# Patient Record
(demographics unavailable — no encounter records)

---

## 2024-11-12 NOTE — HISTORY OF PRESENT ILLNESS
[Hyperlipidemia] : Hyperlipidemia [Hypertension] : Hypertension [Does not check BP] : The patient is not checking blood pressure [<140/90] : Target blood pressure is <140/90 [Target goal met] : BP target goal met [Doing Poorly] : Patient is doing poorly [Maxifestyle management] : lifestyle management [No therapy] : Patient is not on statin therapy [de-identified] : DASH type diet being followed [FreeTextEntry1] : has had some increase in her migraines with the recent stress over the loss of her sister to cancer. Doing a little better but still having some headache- refill for meds given today.

## 2024-11-12 NOTE — REVIEW OF SYSTEMS
[Earache] : earache [Nasal Discharge] : nasal discharge [Sore Throat] : sore throat [Postnasal Drip] : postnasal drip [Joint Pain] : joint pain [Headache] : headache [Negative] : Psychiatric [Hearing Loss] : no hearing loss [Nosebleed] : no nosebleeds [FreeTextEntry2] : stress level continues with recent dx of sister with stage 4 pancreatic cancer, continuing to do poorly currently hospitalized again and still not over the deaths of her significant other and her father. Still clearing out her father's home and very stressed. [FreeTextEntry4] : some vertigo as well - had fall on her face with some abrasions and contusions [FreeTextEntry6] : at this time none, no use of rescue inhaler needed since last visit [FreeTextEntry7] : occasional bloating- related to dietary intake- less than prior despite her stress levels [FreeTextEntry9] : fell on the steps when picking up a check from office - landed on her hands and her knees with left one very bruised and swollen still one week later. [de-identified] : some migraines recently with stress from death of father and good friend 1 year ago- stress level now with her sister who has pancreatic cancer [de-identified] : stress is now related to her sister and the knowledge that she will not likley last that much longer as she struggles with her sister's cancer moore

## 2024-11-12 NOTE — HEALTH RISK ASSESSMENT
[No] : No [Audit-CScore] : 0 [de-identified] : not much recently [de-identified] : tries to eat healthy but does not always do so especially when depressed from her grief

## 2024-11-12 NOTE — PHYSICAL EXAM
[No Acute Distress] : no acute distress [Well Nourished] : well nourished [Normal Sclera/Conjunctiva] : normal sclera/conjunctiva [Normal Outer Ear/Nose] : the outer ears and nose were normal in appearance [Normal Oropharynx] : the oropharynx was normal [Normal TMs] : both tympanic membranes were normal [No JVD] : no jugular venous distention [No Respiratory Distress] : no respiratory distress  [No Accessory Muscle Use] : no accessory muscle use [Clear to Auscultation] : lungs were clear to auscultation bilaterally [Normal Rate] : normal rate  [Regular Rhythm] : with a regular rhythm [Normal S1, S2] : normal S1 and S2 [No Murmur] : no murmur heard [Pedal Pulses Present] : the pedal pulses are present [No Edema] : there was no peripheral edema [No Focal Deficits] : no focal deficits [Normal Gait] : normal gait [Normal Affect] : the affect was normal [Alert and Oriented x3] : oriented to person, place, and time [Normal Insight/Judgement] : insight and judgment were intact [de-identified] : postnasal drip, right tm is pale white and no relex noted , left tm normal [de-identified] : no wheezing noted

## 2024-11-12 NOTE — ASSESSMENT
[FreeTextEntry1] : continue to seek counseling for her grief and continue on the lexapro- regular follow up in 3 months  discussed these plans and also ongoing bp and asthma control abx for new issue of otitis and also sinusitis - ENT evaluation for recurring infection  moderate level MDM

## 2024-11-12 NOTE — HEALTH RISK ASSESSMENT
[No] : No [Audit-CScore] : 0 [de-identified] : not much recently [de-identified] : tries to eat healthy but does not always do so especially when depressed from her grief

## 2024-11-12 NOTE — REVIEW OF SYSTEMS
[Earache] : earache [Nasal Discharge] : nasal discharge [Sore Throat] : sore throat [Postnasal Drip] : postnasal drip [Joint Pain] : joint pain [Headache] : headache [Negative] : Psychiatric [Hearing Loss] : no hearing loss [Nosebleed] : no nosebleeds [FreeTextEntry2] : stress level continues with recent dx of sister with stage 4 pancreatic cancer, continuing to do poorly currently hospitalized again and still not over the deaths of her significant other and her father. Still clearing out her father's home and very stressed. [FreeTextEntry4] : some vertigo as well - had fall on her face with some abrasions and contusions [FreeTextEntry6] : at this time none, no use of rescue inhaler needed since last visit [FreeTextEntry7] : occasional bloating- related to dietary intake- less than prior despite her stress levels [FreeTextEntry9] : fell on the steps when picking up a check from office - landed on her hands and her knees with left one very bruised and swollen still one week later. [de-identified] : some migraines recently with stress from death of father and good friend 1 year ago- stress level now with her sister who has pancreatic cancer [de-identified] : stress is now related to her sister and the knowledge that she will not likley last that much longer as she struggles with her sister's cancer moore

## 2024-11-12 NOTE — HISTORY OF PRESENT ILLNESS
[Hyperlipidemia] : Hyperlipidemia [Hypertension] : Hypertension [Does not check BP] : The patient is not checking blood pressure [<140/90] : Target blood pressure is <140/90 [Target goal met] : BP target goal met [Doing Poorly] : Patient is doing poorly [Maxifestyle management] : lifestyle management [No therapy] : Patient is not on statin therapy [de-identified] : DASH type diet being followed [FreeTextEntry1] : has had some increase in her migraines with the recent stress over the loss of her sister to cancer. Doing a little better but still having some headache- refill for meds given today.

## 2024-11-12 NOTE — PHYSICAL EXAM
[No Acute Distress] : no acute distress [Well Nourished] : well nourished [Normal Sclera/Conjunctiva] : normal sclera/conjunctiva [Normal Outer Ear/Nose] : the outer ears and nose were normal in appearance [Normal Oropharynx] : the oropharynx was normal [Normal TMs] : both tympanic membranes were normal [No JVD] : no jugular venous distention [No Respiratory Distress] : no respiratory distress  [No Accessory Muscle Use] : no accessory muscle use [Clear to Auscultation] : lungs were clear to auscultation bilaterally [Normal Rate] : normal rate  [Regular Rhythm] : with a regular rhythm [Normal S1, S2] : normal S1 and S2 [No Murmur] : no murmur heard [Pedal Pulses Present] : the pedal pulses are present [No Edema] : there was no peripheral edema [No Focal Deficits] : no focal deficits [Normal Gait] : normal gait [Normal Affect] : the affect was normal [Alert and Oriented x3] : oriented to person, place, and time [Normal Insight/Judgement] : insight and judgment were intact [de-identified] : postnasal drip, right tm is pale white and no relex noted , left tm normal [de-identified] : no wheezing noted

## 2025-02-14 NOTE — HISTORY OF PRESENT ILLNESS
[Hyperlipidemia] : Hyperlipidemia [Hypertension] : Hypertension [Does not check BP] : The patient is not checking blood pressure [<140/90] : Target blood pressure is <140/90 [Target goal met] : BP target goal met [Doing Poorly] : Patient is doing poorly [Maxifestyle management] : lifestyle management [No therapy] : Patient is not on statin therapy [de-identified] : DASH type diet being followed [FreeTextEntry1] :  her migraines seem more stable today seeing counselor weekly and this has reduced her stress although still some anxiety and depression post multiple life events over the last 2 years.

## 2025-02-14 NOTE — HEALTH RISK ASSESSMENT
[No] : No [Audit-CScore] : 0 [de-identified] : not much recently [de-identified] : tries to eat healthy but does not always do so especially when depressed from her grief

## 2025-02-14 NOTE — HEALTH RISK ASSESSMENT
[No] : No [Audit-CScore] : 0 [de-identified] : not much recently [de-identified] : tries to eat healthy but does not always do so especially when depressed from her grief

## 2025-02-14 NOTE — REVIEW OF SYSTEMS
[Joint Pain] : joint pain [Headache] : headache [Depression] : depression [Postnasal Drip] : postnasal drip [Negative] : ENT [FreeTextEntry2] : stress level improved at this time  [FreeTextEntry6] : at this time none, no use of rescue inhaler needed since last visit [FreeTextEntry7] : occasional bloating- related to dietary intake- less than prior despite her stress levels [FreeTextEntry9] : fell on the steps when picking up a check from office - landed on her hands and her knees with left one very bruised and swollen still one week later. [de-identified] : some migraines recently with stress from death of father and good friend 1 year ago- stress level now with her sister who has pancreatic cancer

## 2025-02-14 NOTE — PHYSICAL EXAM
[No Acute Distress] : no acute distress [Well Nourished] : well nourished [Normal Sclera/Conjunctiva] : normal sclera/conjunctiva [Normal Outer Ear/Nose] : the outer ears and nose were normal in appearance [Normal Oropharynx] : the oropharynx was normal [No JVD] : no jugular venous distention [No Respiratory Distress] : no respiratory distress  [No Accessory Muscle Use] : no accessory muscle use [Clear to Auscultation] : lungs were clear to auscultation bilaterally [Normal Rate] : normal rate  [Regular Rhythm] : with a regular rhythm [Normal S1, S2] : normal S1 and S2 [No Murmur] : no murmur heard [Pedal Pulses Present] : the pedal pulses are present [No Edema] : there was no peripheral edema [No Focal Deficits] : no focal deficits [Normal Gait] : normal gait [Normal Affect] : the affect was normal [Alert and Oriented x3] : oriented to person, place, and time [Normal Insight/Judgement] : insight and judgment were intact [de-identified] : postnasal drip,  [de-identified] : no wheezing noted

## 2025-02-14 NOTE — ASSESSMENT
[FreeTextEntry1] : continue to seek counseling for her grief but ready now for tapering of her medication- tapering and dicontinuation discussed and shared decision making around stopping at this time. to call for any concerns as she tapers over the next 1-2 months time.   discussed ongoing bp and asthma control  moderate level MDM with shared decision making on titration or stopping of escitalopram.

## 2025-02-14 NOTE — HISTORY OF PRESENT ILLNESS
[Hyperlipidemia] : Hyperlipidemia [Hypertension] : Hypertension [Does not check BP] : The patient is not checking blood pressure [<140/90] : Target blood pressure is <140/90 [Target goal met] : BP target goal met [Doing Poorly] : Patient is doing poorly [Maxifestyle management] : lifestyle management [No therapy] : Patient is not on statin therapy [de-identified] : DASH type diet being followed [FreeTextEntry1] :  her migraines seem more stable today seeing counselor weekly and this has reduced her stress although still some anxiety and depression post multiple life events over the last 2 years.

## 2025-06-04 NOTE — PHYSICAL EXAM
[de-identified] : Constitutional o Appearance : well-nourished, well developed, alert, in no acute distress  Head and Face o Head :  Inspection : atraumatic, normocephalic o Face :  Inspection : no visible rash or discoloration Respiratory o Respiratory Effort: breathing unlabored  Neurologic o Mental Status Examination :  Orientation : alert and oriented X 3 Psychiatric o Mood and Affect: mood normal, affect appropriate Cardiovascular o Observation/Palpation : - no swelling Lymphatic o Additional Nodes : No palpable lymph nodes present  Lumbosacral Spine o Inspection : no visible rash or discoloration o Palpation : no paraspinal musculature tenderness o Range of Motion : arc of motion full in all planes, no crepitance or pain with ROM o Muscle Strength : paraspinal muscle strength and tone within normal limits o Muscle Tone : paraspinal muscle strength and tone within normal limits o Tests: straight leg test negative and VARGHESE test negative bilaterally   Right Lower Extremity o Buttock : no tenderness, swelling or deformities o Right Hip :  Inspection/Palpation : no tenderness, no swelling or deformities, incision well-healed   Range of Motion : full flexion and rotation, painless, no crepitance  Stability : joint stability intact  Strength : extension, flexion 5/5, adduction, abduction 5/5, internal rotation and external rotation  Tests: Champ's test negative  Left Lower Extremity o Buttock : no tenderness, swelling or deformities  o Left Hip   Inspection/Palpation : minimal greater trochanteric tenderness, no swelling or deformities, incision well-healed   Range of Motion : full flexion and rotation, painless, no crepitance  Stability : joint stability intact  Strength : extension, flexion 5/5, adduction, abduction 5/5, internal rotation and external rotation  Tests: Champ's test negative  Gait: gait normal, no significant extremity swelling or lymphedema, good proprioception and balance, left slightly longer, excellent core strength. slight waddling gait abductor lurch to the right  Radiology Results 6/4/2025  o Pelvis and Hips: AP pelvis and lateral of both hips are obtained and reveal excellent position of her bilateral total hip replacements with no sign of loosening or change in position

## 2025-06-04 NOTE — HISTORY OF PRESENT ILLNESS
[de-identified] : 77 year old patient presents s/p bilateral THR (right 09/27/16, left 05/08/18). She is thrilled with her progress and results. She denies any swelling or buckling. She is thrilled with her progress and results. She is staying as active as possible. She notes she is keeping her strength up. Patient denies that she has any numbness or tingling. She does note she is walking with a lurch due to muscle memory. She denies that the pain wakes her from sleep. She has minimal complaints today. She has not had a recent bone density test.   Radiology Results 5/29/2024 o Pelvis and Left Hip: AP pelvis and lateral of the hips are obtained and reveal excellent position of her bilateral total hip replacements with no sign of loosening.  Radiology Results 5/26/2023 o Pelvis and Left Hip : AP pelvis and lateral of the hips are obtained and reveal excellent position of her bilateral total hip replacements with no sign of loosening.

## 2025-06-04 NOTE — ADDENDUM
[FreeTextEntry1] : I, LEE LOMELI, acted solely as a scribe for Dr. Carlos Jolly on this date 06/04/2025.  All medical record entries made by the Scribe were at my, Dr. Carlos Jolly, direction and personally dictated by me on 06/04/2025. I have reviewed the chart and agree that the record accurately reflects my personal performance of the history, physical exam, assessment and plan. I have also personally directed, reviewed, and agreed with the chart.

## 2025-06-04 NOTE — DISCUSSION/SUMMARY
[de-identified] : I went over the pathophysiology of the patient's symptoms in great detail with the patient. I discussed the underlying pathophysiology of the patient's condition in great detail with the patient. I went over the patient's x-rays with them in great detail. She needs to avoid high-impact activities such as running and jumping or riding a treadmill. I recommend alternative activities such as riding a stationary bike or elliptical on low tension. She should focus on light weight and high repetition exercises. She should avoid squatting and kneeling.  We have asked her to speak to Dr. Boo concerning a bone density test.  All of their questions were answered. They understand and consent to the plan.   FU in one year.

## 2025-06-11 NOTE — PHYSICAL EXAM
[No Acute Distress] : no acute distress [Normal Sclera/Conjunctiva] : normal sclera/conjunctiva [Well Nourished] : well nourished [Normal Outer Ear/Nose] : the outer ears and nose were normal in appearance [Normal Oropharynx] : the oropharynx was normal [No JVD] : no jugular venous distention [No Respiratory Distress] : no respiratory distress  [No Accessory Muscle Use] : no accessory muscle use [Clear to Auscultation] : lungs were clear to auscultation bilaterally [Normal Rate] : normal rate  [Normal S1, S2] : normal S1 and S2 [Regular Rhythm] : with a regular rhythm [No Murmur] : no murmur heard [Pedal Pulses Present] : the pedal pulses are present [No Focal Deficits] : no focal deficits [No Edema] : there was no peripheral edema [Normal Gait] : normal gait [Normal Affect] : the affect was normal [Normal Insight/Judgement] : insight and judgment were intact [Alert and Oriented x3] : oriented to person, place, and time [Normal Supraclavicular Nodes] : no supraclavicular lymphadenopathy [Normal Anterior Cervical Nodes] : no anterior cervical lymphadenopathy [de-identified] : postnasal drip,  [de-identified] : no wheezing noted [FreeTextEntry2] : elevated rash on the forehead skin tone in color and minimally raised, arms with similar rash - likely some type of papillomatous lesions, no adenopathy no other findings

## 2025-06-11 NOTE — REVIEW OF SYSTEMS
[Postnasal Drip] : postnasal drip [Joint Pain] : joint pain [Headache] : headache [Depression] : depression [FreeTextEntry9] : fell on the steps when picking up a check from office - landed on her hands and her knees with left one very bruised and swollen still one week later. [Skin Rash] : skin rash [Negative] : Musculoskeletal [FreeTextEntry2] : stress level improved at this time  [FreeTextEntry7] : occasional bloating- related to dietary intake- less than prior but not gone [FreeTextEntry6] : at this time none, no use of rescue inhaler needed since last visit [de-identified] : forehead and extremities with new rash about 2 week slightly raised no coloration, minimally itchy but not known to have any exposures [de-identified] : some migraines recently with stress from death of father and good friend 1 year ago- stress level now with her sister who has pancreatic cancer

## 2025-06-11 NOTE — REVIEW OF SYSTEMS
[Postnasal Drip] : postnasal drip [Joint Pain] : joint pain [Headache] : headache [Depression] : depression [FreeTextEntry9] : fell on the steps when picking up a check from office - landed on her hands and her knees with left one very bruised and swollen still one week later. [Skin Rash] : skin rash [Negative] : Musculoskeletal [FreeTextEntry2] : stress level improved at this time  [FreeTextEntry6] : at this time none, no use of rescue inhaler needed since last visit [FreeTextEntry7] : occasional bloating- related to dietary intake- less than prior but not gone [de-identified] : forehead and extremities with new rash about 2 week slightly raised no coloration, minimally itchy but not known to have any exposures [de-identified] : some migraines recently with stress from death of father and good friend 1 year ago- stress level now with her sister who has pancreatic cancer

## 2025-06-11 NOTE — HISTORY OF PRESENT ILLNESS
[Hyperlipidemia] : Hyperlipidemia [Hypertension] : Hypertension [Does not check BP] : The patient is not checking blood pressure [<140/90] : Target blood pressure is <140/90 [Target goal met] : BP target goal met [Doing Poorly] : Patient is doing poorly [Maxifestyle management] : lifestyle management [No therapy] : Patient is not on statin therapy [de-identified] : DASH type diet being followed, no sx of dizziness, headache, chest pain or sob, palpitations  [FreeTextEntry1] :  her migraines seem more stable today seeing counselor weekly and this has reduced her stress although still some anxiety and depression post multiple life events over the last 2 years. taper of SSRI and stopped

## 2025-06-11 NOTE — HEALTH RISK ASSESSMENT
[No] : No [Audit-CScore] : 0 [de-identified] : ortho- notes and testing reviewed [de-identified] : not much recently [de-identified] : tries to eat healthy but does not always do so especially when depressed from her grief

## 2025-06-11 NOTE — ASSESSMENT
[FreeTextEntry1] : continue to seek counseling for her grief but ready now for tapering of her medication- tapering and dicontinuation was completed and she continues to do pretty well.   discussed options for skin evaluation and derm referral made -Dr. Jacob discussed ongoing bp and asthma control dexa study ordered for f/u  mammogram due and ordered  labs reviewed today  moderate level MDM with continuity of care for this encounter -f/u in 3 months for repeat BP check

## 2025-06-11 NOTE — HISTORY OF PRESENT ILLNESS
[Hyperlipidemia] : Hyperlipidemia [Hypertension] : Hypertension [Does not check BP] : The patient is not checking blood pressure [<140/90] : Target blood pressure is <140/90 [Target goal met] : BP target goal met [Doing Poorly] : Patient is doing poorly [Maxifestyle management] : lifestyle management [No therapy] : Patient is not on statin therapy [de-identified] : DASH type diet being followed, no sx of dizziness, headache, chest pain or sob, palpitations  [FreeTextEntry1] :  her migraines seem more stable today seeing counselor weekly and this has reduced her stress although still some anxiety and depression post multiple life events over the last 2 years. taper of SSRI and stopped

## 2025-06-11 NOTE — PHYSICAL EXAM
[No Acute Distress] : no acute distress [Normal Sclera/Conjunctiva] : normal sclera/conjunctiva [Normal Outer Ear/Nose] : the outer ears and nose were normal in appearance [Well Nourished] : well nourished [Normal Oropharynx] : the oropharynx was normal [No JVD] : no jugular venous distention [No Respiratory Distress] : no respiratory distress  [No Accessory Muscle Use] : no accessory muscle use [Clear to Auscultation] : lungs were clear to auscultation bilaterally [Normal Rate] : normal rate  [Regular Rhythm] : with a regular rhythm [Normal S1, S2] : normal S1 and S2 [Pedal Pulses Present] : the pedal pulses are present [No Murmur] : no murmur heard [No Focal Deficits] : no focal deficits [No Edema] : there was no peripheral edema [Normal Affect] : the affect was normal [Normal Gait] : normal gait [Alert and Oriented x3] : oriented to person, place, and time [Normal Insight/Judgement] : insight and judgment were intact [Normal Supraclavicular Nodes] : no supraclavicular lymphadenopathy [Normal Anterior Cervical Nodes] : no anterior cervical lymphadenopathy [de-identified] : postnasal drip,  [de-identified] : no wheezing noted [FreeTextEntry2] : elevated rash on the forehead skin tone in color and minimally raised, arms with similar rash - likely some type of papillomatous lesions, no adenopathy no other findings

## 2025-06-11 NOTE — REVIEW OF SYSTEMS
[Postnasal Drip] : postnasal drip [Joint Pain] : joint pain [Headache] : headache [Depression] : depression [FreeTextEntry9] : fell on the steps when picking up a check from office - landed on her hands and her knees with left one very bruised and swollen still one week later. [Skin Rash] : skin rash [Negative] : Musculoskeletal [FreeTextEntry2] : stress level improved at this time  [FreeTextEntry6] : at this time none, no use of rescue inhaler needed since last visit [FreeTextEntry7] : occasional bloating- related to dietary intake- less than prior but not gone [de-identified] : forehead and extremities with new rash about 2 week slightly raised no coloration, minimally itchy but not known to have any exposures [de-identified] : some migraines recently with stress from death of father and good friend 1 year ago- stress level now with her sister who has pancreatic cancer

## 2025-06-11 NOTE — HEALTH RISK ASSESSMENT
[No] : No [de-identified] : ortho- notes and testing reviewed [Audit-CScore] : 0 [de-identified] : not much recently [de-identified] : tries to eat healthy but does not always do so especially when depressed from her grief

## 2025-06-11 NOTE — HISTORY OF PRESENT ILLNESS
[Hyperlipidemia] : Hyperlipidemia [Hypertension] : Hypertension [Does not check BP] : The patient is not checking blood pressure [<140/90] : Target blood pressure is <140/90 [Target goal met] : BP target goal met [Doing Poorly] : Patient is doing poorly [Maxifestyle management] : lifestyle management [No therapy] : Patient is not on statin therapy [de-identified] : DASH type diet being followed, no sx of dizziness, headache, chest pain or sob, palpitations  [FreeTextEntry1] :  her migraines seem more stable today seeing counselor weekly and this has reduced her stress although still some anxiety and depression post multiple life events over the last 2 years. taper of SSRI and stopped

## 2025-06-11 NOTE — HEALTH RISK ASSESSMENT
[No] : No [Audit-CScore] : 0 [de-identified] : ortho- notes and testing reviewed [de-identified] : tries to eat healthy but does not always do so especially when depressed from her grief [de-identified] : not much recently

## 2025-06-11 NOTE — PHYSICAL EXAM
[No Acute Distress] : no acute distress [Normal Sclera/Conjunctiva] : normal sclera/conjunctiva [Well Nourished] : well nourished [Normal Outer Ear/Nose] : the outer ears and nose were normal in appearance [Normal Oropharynx] : the oropharynx was normal [No JVD] : no jugular venous distention [No Respiratory Distress] : no respiratory distress  [No Accessory Muscle Use] : no accessory muscle use [Clear to Auscultation] : lungs were clear to auscultation bilaterally [Normal Rate] : normal rate  [Normal S1, S2] : normal S1 and S2 [Regular Rhythm] : with a regular rhythm [Pedal Pulses Present] : the pedal pulses are present [No Murmur] : no murmur heard [No Focal Deficits] : no focal deficits [No Edema] : there was no peripheral edema [Normal Gait] : normal gait [Normal Affect] : the affect was normal [Alert and Oriented x3] : oriented to person, place, and time [Normal Insight/Judgement] : insight and judgment were intact [Normal Supraclavicular Nodes] : no supraclavicular lymphadenopathy [Normal Anterior Cervical Nodes] : no anterior cervical lymphadenopathy [de-identified] : postnasal drip,  [de-identified] : no wheezing noted [FreeTextEntry2] : elevated rash on the forehead skin tone in color and minimally raised, arms with similar rash - likely some type of papillomatous lesions, no adenopathy no other findings